# Patient Record
Sex: MALE | Race: WHITE | NOT HISPANIC OR LATINO | Employment: FULL TIME | URBAN - METROPOLITAN AREA
[De-identification: names, ages, dates, MRNs, and addresses within clinical notes are randomized per-mention and may not be internally consistent; named-entity substitution may affect disease eponyms.]

---

## 2018-07-04 ENCOUNTER — HOSPITAL ENCOUNTER (OUTPATIENT)
Facility: MEDICAL CENTER | Age: 78
End: 2018-07-05
Attending: EMERGENCY MEDICINE | Admitting: HOSPITALIST
Payer: COMMERCIAL

## 2018-07-04 ENCOUNTER — APPOINTMENT (OUTPATIENT)
Dept: RADIOLOGY | Facility: MEDICAL CENTER | Age: 78
End: 2018-07-04
Attending: EMERGENCY MEDICINE
Payer: COMMERCIAL

## 2018-07-04 DIAGNOSIS — R55 SYNCOPE, UNSPECIFIED SYNCOPE TYPE: ICD-10-CM

## 2018-07-04 PROBLEM — I49.9 ARRHYTHMIA: Status: ACTIVE | Noted: 2018-07-04

## 2018-07-04 PROBLEM — E78.5 HLD (HYPERLIPIDEMIA): Status: ACTIVE | Noted: 2018-07-04

## 2018-07-04 LAB
ALBUMIN SERPL BCP-MCNC: 4.4 G/DL (ref 3.2–4.9)
ALBUMIN/GLOB SERPL: 2 G/DL
ALP SERPL-CCNC: 104 U/L (ref 30–99)
ALT SERPL-CCNC: 17 U/L (ref 2–50)
ANION GAP SERPL CALC-SCNC: 9 MMOL/L (ref 0–11.9)
APPEARANCE UR: CLEAR
APTT PPP: 38.6 SEC (ref 24.7–36)
AST SERPL-CCNC: 19 U/L (ref 12–45)
BASOPHILS # BLD AUTO: 0.3 % (ref 0–1.8)
BASOPHILS # BLD: 0.03 K/UL (ref 0–0.12)
BILIRUB SERPL-MCNC: 1.5 MG/DL (ref 0.1–1.5)
BILIRUB UR QL STRIP.AUTO: NEGATIVE
BNP SERPL-MCNC: 507 PG/ML (ref 0–100)
BUN SERPL-MCNC: 20 MG/DL (ref 8–22)
CALCIUM SERPL-MCNC: 10.8 MG/DL (ref 8.5–10.5)
CHLORIDE SERPL-SCNC: 107 MMOL/L (ref 96–112)
CO2 SERPL-SCNC: 20 MMOL/L (ref 20–33)
COLOR UR: YELLOW
CREAT SERPL-MCNC: 1.44 MG/DL (ref 0.5–1.4)
DIGOXIN SERPL-MCNC: 0.3 NG/ML (ref 0.8–2)
EKG IMPRESSION: NORMAL
EOSINOPHIL # BLD AUTO: 0.23 K/UL (ref 0–0.51)
EOSINOPHIL NFR BLD: 2.4 % (ref 0–6.9)
ERYTHROCYTE [DISTWIDTH] IN BLOOD BY AUTOMATED COUNT: 51 FL (ref 35.9–50)
GLOBULIN SER CALC-MCNC: 2.2 G/DL (ref 1.9–3.5)
GLUCOSE SERPL-MCNC: 136 MG/DL (ref 65–99)
GLUCOSE UR STRIP.AUTO-MCNC: NEGATIVE MG/DL
HCT VFR BLD AUTO: 40.8 % (ref 42–52)
HGB BLD-MCNC: 13.4 G/DL (ref 14–18)
IMM GRANULOCYTES # BLD AUTO: 0.1 K/UL (ref 0–0.11)
IMM GRANULOCYTES NFR BLD AUTO: 1.1 % (ref 0–0.9)
INR PPP: 1.72 (ref 0.87–1.13)
KETONES UR STRIP.AUTO-MCNC: NEGATIVE MG/DL
LACTATE BLD-SCNC: 1.8 MMOL/L (ref 0.5–2)
LEUKOCYTE ESTERASE UR QL STRIP.AUTO: NEGATIVE
LYMPHOCYTES # BLD AUTO: 1.95 K/UL (ref 1–4.8)
LYMPHOCYTES NFR BLD: 20.7 % (ref 22–41)
MCH RBC QN AUTO: 30.6 PG (ref 27–33)
MCHC RBC AUTO-ENTMCNC: 32.8 G/DL (ref 33.7–35.3)
MCV RBC AUTO: 93.2 FL (ref 81.4–97.8)
MICRO URNS: NORMAL
MONOCYTES # BLD AUTO: 0.89 K/UL (ref 0–0.85)
MONOCYTES NFR BLD AUTO: 9.4 % (ref 0–13.4)
NEUTROPHILS # BLD AUTO: 6.24 K/UL (ref 1.82–7.42)
NEUTROPHILS NFR BLD: 66.1 % (ref 44–72)
NITRITE UR QL STRIP.AUTO: NEGATIVE
NRBC # BLD AUTO: 0 K/UL
NRBC BLD-RTO: 0 /100 WBC
PH UR STRIP.AUTO: 5 [PH]
PLATELET # BLD AUTO: 129 K/UL (ref 164–446)
PMV BLD AUTO: 11.7 FL (ref 9–12.9)
POTASSIUM SERPL-SCNC: 4.2 MMOL/L (ref 3.6–5.5)
PROT SERPL-MCNC: 6.6 G/DL (ref 6–8.2)
PROT UR QL STRIP: NEGATIVE MG/DL
PROTHROMBIN TIME: 19.8 SEC (ref 12–14.6)
RBC # BLD AUTO: 4.38 M/UL (ref 4.7–6.1)
RBC UR QL AUTO: NEGATIVE
SODIUM SERPL-SCNC: 136 MMOL/L (ref 135–145)
SP GR UR STRIP.AUTO: 1.01
T4 FREE SERPL-MCNC: 0.82 NG/DL (ref 0.53–1.43)
TROPONIN I SERPL-MCNC: 0.02 NG/ML (ref 0–0.04)
TROPONIN I SERPL-MCNC: 0.02 NG/ML (ref 0–0.04)
TSH SERPL DL<=0.005 MIU/L-ACNC: 11.33 UIU/ML (ref 0.38–5.33)
UROBILINOGEN UR STRIP.AUTO-MCNC: 1 MG/DL
WBC # BLD AUTO: 9.4 K/UL (ref 4.8–10.8)

## 2018-07-04 PROCEDURE — 80053 COMPREHEN METABOLIC PANEL: CPT

## 2018-07-04 PROCEDURE — 70450 CT HEAD/BRAIN W/O DYE: CPT

## 2018-07-04 PROCEDURE — G0378 HOSPITAL OBSERVATION PER HR: HCPCS

## 2018-07-04 PROCEDURE — 85610 PROTHROMBIN TIME: CPT

## 2018-07-04 PROCEDURE — 93005 ELECTROCARDIOGRAM TRACING: CPT | Performed by: EMERGENCY MEDICINE

## 2018-07-04 PROCEDURE — 84484 ASSAY OF TROPONIN QUANT: CPT

## 2018-07-04 PROCEDURE — 700102 HCHG RX REV CODE 250 W/ 637 OVERRIDE(OP): Performed by: HOSPITALIST

## 2018-07-04 PROCEDURE — 84443 ASSAY THYROID STIM HORMONE: CPT

## 2018-07-04 PROCEDURE — 71045 X-RAY EXAM CHEST 1 VIEW: CPT

## 2018-07-04 PROCEDURE — 83880 ASSAY OF NATRIURETIC PEPTIDE: CPT

## 2018-07-04 PROCEDURE — 84439 ASSAY OF FREE THYROXINE: CPT

## 2018-07-04 PROCEDURE — A9270 NON-COVERED ITEM OR SERVICE: HCPCS | Performed by: INTERNAL MEDICINE

## 2018-07-04 PROCEDURE — 700102 HCHG RX REV CODE 250 W/ 637 OVERRIDE(OP): Performed by: INTERNAL MEDICINE

## 2018-07-04 PROCEDURE — 700105 HCHG RX REV CODE 258: Performed by: HOSPITALIST

## 2018-07-04 PROCEDURE — 81003 URINALYSIS AUTO W/O SCOPE: CPT

## 2018-07-04 PROCEDURE — 85730 THROMBOPLASTIN TIME PARTIAL: CPT

## 2018-07-04 PROCEDURE — A9270 NON-COVERED ITEM OR SERVICE: HCPCS | Performed by: HOSPITALIST

## 2018-07-04 PROCEDURE — 700105 HCHG RX REV CODE 258: Performed by: INTERNAL MEDICINE

## 2018-07-04 PROCEDURE — 304561 HCHG STAT O2

## 2018-07-04 PROCEDURE — 99285 EMERGENCY DEPT VISIT HI MDM: CPT

## 2018-07-04 PROCEDURE — 99220 PR INITIAL OBSERVATION CARE,LEVL III: CPT | Performed by: HOSPITALIST

## 2018-07-04 PROCEDURE — 83605 ASSAY OF LACTIC ACID: CPT

## 2018-07-04 PROCEDURE — 80162 ASSAY OF DIGOXIN TOTAL: CPT

## 2018-07-04 PROCEDURE — 93005 ELECTROCARDIOGRAM TRACING: CPT

## 2018-07-04 PROCEDURE — 99358 PROLONG SERVICE W/O CONTACT: CPT | Performed by: INTERNAL MEDICINE

## 2018-07-04 PROCEDURE — 85025 COMPLETE CBC W/AUTO DIFF WBC: CPT

## 2018-07-04 RX ORDER — ATORVASTATIN CALCIUM 10 MG/1
10 TABLET, FILM COATED ORAL EVERY MORNING
COMMUNITY

## 2018-07-04 RX ORDER — SODIUM CHLORIDE 9 MG/ML
INJECTION, SOLUTION INTRAVENOUS CONTINUOUS
Status: DISCONTINUED | OUTPATIENT
Start: 2018-07-04 | End: 2018-07-05

## 2018-07-04 RX ORDER — POLYETHYLENE GLYCOL 3350 17 G/17G
1 POWDER, FOR SOLUTION ORAL
Status: DISCONTINUED | OUTPATIENT
Start: 2018-07-04 | End: 2018-07-05 | Stop reason: HOSPADM

## 2018-07-04 RX ORDER — BISACODYL 10 MG
10 SUPPOSITORY, RECTAL RECTAL
Status: DISCONTINUED | OUTPATIENT
Start: 2018-07-04 | End: 2018-07-05 | Stop reason: HOSPADM

## 2018-07-04 RX ORDER — DIGOXIN 125 MCG
125 TABLET ORAL DAILY
Status: DISCONTINUED | OUTPATIENT
Start: 2018-07-04 | End: 2018-07-05 | Stop reason: HOSPADM

## 2018-07-04 RX ORDER — IBUPROFEN 200 MG
800 TABLET ORAL 3 TIMES DAILY PRN
Status: ON HOLD | COMMUNITY
End: 2018-07-05

## 2018-07-04 RX ORDER — ATORVASTATIN CALCIUM 10 MG/1
10 TABLET, FILM COATED ORAL NIGHTLY
Status: DISCONTINUED | OUTPATIENT
Start: 2018-07-04 | End: 2018-07-05 | Stop reason: HOSPADM

## 2018-07-04 RX ORDER — SODIUM CHLORIDE 9 MG/ML
1000 INJECTION, SOLUTION INTRAVENOUS ONCE
Status: COMPLETED | OUTPATIENT
Start: 2018-07-04 | End: 2018-07-04

## 2018-07-04 RX ORDER — ALLOPURINOL 300 MG/1
300 TABLET ORAL EVERY MORNING
COMMUNITY

## 2018-07-04 RX ORDER — DIGOXIN 125 MCG
125 TABLET ORAL EVERY MORNING
COMMUNITY

## 2018-07-04 RX ORDER — ACETAMINOPHEN 500 MG
500 TABLET ORAL EVERY 6 HOURS PRN
Status: DISCONTINUED | OUTPATIENT
Start: 2018-07-04 | End: 2018-07-05 | Stop reason: HOSPADM

## 2018-07-04 RX ORDER — AMOXICILLIN 250 MG
2 CAPSULE ORAL 2 TIMES DAILY
Status: DISCONTINUED | OUTPATIENT
Start: 2018-07-04 | End: 2018-07-05 | Stop reason: HOSPADM

## 2018-07-04 RX ORDER — TESTOSTERONE GEL, 1% 10 MG/G
50 GEL TRANSDERMAL DAILY
Status: ON HOLD | COMMUNITY
End: 2018-07-04

## 2018-07-04 RX ORDER — TRAMADOL HYDROCHLORIDE 50 MG/1
50 TABLET ORAL 2 TIMES DAILY PRN
COMMUNITY

## 2018-07-04 RX ORDER — AMOXICILLIN 500 MG
1200 CAPSULE ORAL EVERY MORNING
COMMUNITY

## 2018-07-04 RX ORDER — TESTOSTERONE 40.5 MG/2.5G
40.5 GEL TOPICAL DAILY
COMMUNITY

## 2018-07-04 RX ORDER — ALBUTEROL SULFATE 90 UG/1
2 AEROSOL, METERED RESPIRATORY (INHALATION) EVERY 4 HOURS PRN
COMMUNITY

## 2018-07-04 RX ADMIN — DIGOXIN 125 MCG: 125 TABLET ORAL at 17:37

## 2018-07-04 RX ADMIN — SODIUM CHLORIDE 1000 ML: 9 INJECTION, SOLUTION INTRAVENOUS at 20:52

## 2018-07-04 RX ADMIN — SODIUM CHLORIDE 1000 ML: 9 INJECTION, SOLUTION INTRAVENOUS at 06:48

## 2018-07-04 RX ADMIN — ACETAMINOPHEN 500 MG: 500 TABLET ORAL at 17:37

## 2018-07-04 RX ADMIN — ATORVASTATIN CALCIUM 10 MG: 10 TABLET, FILM COATED ORAL at 20:51

## 2018-07-04 RX ADMIN — STANDARDIZED SENNA CONCENTRATE AND DOCUSATE SODIUM 2 TABLET: 8.6; 5 TABLET, FILM COATED ORAL at 17:42

## 2018-07-04 ASSESSMENT — COGNITIVE AND FUNCTIONAL STATUS - GENERAL
PERSONAL GROOMING: TOTAL
WALKING IN HOSPITAL ROOM: A LITTLE
SUGGESTED CMS G CODE MODIFIER MOBILITY: CL
MOVING FROM LYING ON BACK TO SITTING ON SIDE OF FLAT BED: A LOT
DAILY ACTIVITIY SCORE: 10
MOVING TO AND FROM BED TO CHAIR: A LOT
TURNING FROM BACK TO SIDE WHILE IN FLAT BAD: A LOT
STANDING UP FROM CHAIR USING ARMS: A LOT
DRESSING REGULAR UPPER BODY CLOTHING: A LOT
TOILETING: A LOT
SUGGESTED CMS G CODE MODIFIER DAILY ACTIVITY: CL
MOBILITY SCORE: 13
CLIMB 3 TO 5 STEPS WITH RAILING: A LOT
HELP NEEDED FOR BATHING: A LOT
DRESSING REGULAR LOWER BODY CLOTHING: A LOT
EATING MEALS: TOTAL

## 2018-07-04 ASSESSMENT — PATIENT HEALTH QUESTIONNAIRE - PHQ9
SUM OF ALL RESPONSES TO PHQ9 QUESTIONS 1 AND 2: 0
2. FEELING DOWN, DEPRESSED, IRRITABLE, OR HOPELESS: NOT AT ALL
1. LITTLE INTEREST OR PLEASURE IN DOING THINGS: NOT AT ALL

## 2018-07-04 ASSESSMENT — ENCOUNTER SYMPTOMS
LOSS OF CONSCIOUSNESS: 1
HEADACHES: 0
DIZZINESS: 0
MYALGIAS: 0
ABDOMINAL PAIN: 0
CHILLS: 0
DEPRESSION: 0
TINGLING: 0
FALLS: 1
SORE THROAT: 0
WEAKNESS: 1
PHOTOPHOBIA: 0
FEVER: 0
PALPITATIONS: 0
WHEEZING: 0
DIARRHEA: 0
VOMITING: 0
COUGH: 0
NAUSEA: 0
SHORTNESS OF BREATH: 0
FOCAL WEAKNESS: 0

## 2018-07-04 ASSESSMENT — PAIN SCALES - GENERAL
PAINLEVEL_OUTOF10: 2
PAINLEVEL_OUTOF10: 0
PAINLEVEL_OUTOF10: 3
PAINLEVEL_OUTOF10: 0
PAINLEVEL_OUTOF10: 0

## 2018-07-04 ASSESSMENT — LIFESTYLE VARIABLES
AVERAGE NUMBER OF DAYS PER WEEK YOU HAVE A DRINK CONTAINING ALCOHOL: 2
TOTAL SCORE: 0
EVER_SMOKED: YES
ALCOHOL_USE: YES
HOW MANY TIMES IN THE PAST YEAR HAVE YOU HAD 5 OR MORE DRINKS IN A DAY: 0
TOTAL SCORE: 0
ON A TYPICAL DAY WHEN YOU DRINK ALCOHOL HOW MANY DRINKS DO YOU HAVE: 0
HAVE PEOPLE ANNOYED YOU BY CRITICIZING YOUR DRINKING: NO
EVER FELT BAD OR GUILTY ABOUT YOUR DRINKING: NO
EVER HAD A DRINK FIRST THING IN THE MORNING TO STEADY YOUR NERVES TO GET RID OF A HANGOVER: NO
CONSUMPTION TOTAL: NEGATIVE
TOTAL SCORE: 0
HAVE YOU EVER FELT YOU SHOULD CUT DOWN ON YOUR DRINKING: NO

## 2018-07-04 ASSESSMENT — ACTIVITIES OF DAILY LIVING (ADL): TOILETING: REQUIRES ASSIST

## 2018-07-04 NOTE — THERAPY
OT order received. Per charts, pt scheduled for heart catherization tomorrow. Will hold OT eval until appropriate.    Linda Mccormack OTR/L  Pager: [phone]

## 2018-07-04 NOTE — ASSESSMENT & PLAN NOTE
"Patient is a rather poor historian, he believes that his pacemaker was implanted because of bradycardia but he also believes that he has been diagnosed with atrial fibrillation in the past which would fit in with his medications digoxin and xarelto. However, he also states that he takes the xarelto because he had been having \"mini strokes\". Nevertheless, we will continue to monitor on telemetry overnight with further workup as noted above. Continue home xarelto on digoxin.  "

## 2018-07-04 NOTE — PROGRESS NOTES
Spent 31 minutes reviewing outside hospital records from [facility] and talking with Dr Swain, patient's cardiologist in [place], [place] about the plan. ECHO was normal with mildly elevated RVSP, otherwise extensive work up for syncope was negative including tilt table test. Patient does express some positional hypoxia and there is concern for need of R heart cath, which patient is agreeable to undergo here now. Consulted Dr Buckner of cardiology. Also, extensive discussion with family and it appears the syncopal episodes relate quite well to micturition and that he should seek out a urologist in [place] for possible urodynamic monitoring and treatment of what sounds like urge incontinence.    I spent between 11:45 AM to 12:16 pm reviewing above records.

## 2018-07-04 NOTE — H&P
" Hospital Medicine History and Physical    Date of Service  7/4/2018    Chief Complaint  Chief Complaint   Patient presents with   • Syncope     Patient was found down by family       History of Presenting Illness  78 y.o. male who presented on 7/4/2018 after a syncopal episode at home. The patient has no memory of these particular circumstances which brought him to the hospital. However son had been present at the time and is able to provide history. His son states that the patient had been going to bed with his supplemental oxygen on. His son and his wife managed to get him onto the bed and put his CPAP on at which point the patient had become lightheaded. He laid down and it needed to use the urinal therefore his son stepped out of the room while his wife assisted him. Seconds later, the patient's son was called back into the room by his wife who reported that the patient had \"stopped breathing\". She had begun mouth-to-mouth respirations and another relative had begun CPR. Son does not believe anyone had checked for pulse therefore it is unknown if any of these resuscitation measures were actually needed at that time. Shortly after initiation of CPR, the patient regained consciousness but was confused. EMS was alerted he was brought to the hospital.    For the past 3 months, the patient has had a significant physical deconditioning and decline. His son states that 3 months ago, he was up and independent. Since then, he has fallen at least 6 times resulting in 2 broken arms. In the last 2 weeks, he had an extended hospital stay at [facility] in [place], [place] at which time he had apparently been worked up extensively for these recurrent syncopal episodes. This included tilt table testing, EEG, MRI of the brain, echocardiogram and carotid ultrasounds. He was followed closely by his cardiologist who had recommended a potential test to assess for vascular insufficiency in the left leg however this " "is the one last test which is still pending. According to the son, the patient has occasional episodes of confusion after the syncopal episodes but by and in large, he is alert and oriented when he regains consciousness with no symptoms consistent with postictal confusion.      Primary Care Physician  No primary care provider on file.    Consultants  None    Code Status  Full    Review of Systems  Review of Systems   Constitutional: Positive for malaise/fatigue. Negative for chills and fever.   HENT: Negative for congestion and sore throat.    Eyes: Negative for photophobia.   Respiratory: Negative for cough, shortness of breath and wheezing.    Cardiovascular: Negative for chest pain and palpitations.   Gastrointestinal: Negative for abdominal pain, diarrhea, nausea and vomiting.   Genitourinary: Negative for dysuria.   Musculoskeletal: Positive for falls. Negative for myalgias.   Skin: Negative.    Neurological: Positive for loss of consciousness and weakness. Negative for dizziness, tingling, focal weakness and headaches.   Psychiatric/Behavioral: Negative for depression and suicidal ideas.        Past Medical History  Past Medical History:   Diagnosis Date   • COPD (chronic obstructive pulmonary disease) (HCC)    • Pacemaker    Cardiac arrhythmia, hyperlipidemia, previous \"mini strokes\".    Surgical History  Pacemaker implant, lap band, appendectomy    Medications  No current facility-administered medications on file prior to encounter.      No current outpatient prescriptions on file prior to encounter.       Family History  No family history of seizure disorder    Social History  Social History   Substance Use Topics   • Smoking status: Never Smoker   • Smokeless tobacco: Never Used   • Alcohol use No      Comment: occ       Allergies  No Known Allergies     Physical Exam  Laboratory   Hemodynamics  Temp (24hrs), Av.9 °C (96.6 °F), Min:35.9 °C (96.6 °F), Max:35.9 °C (96.6 °F)   Temperature: 35.9 °C (96.6 " °F)  Pulse  Av.8  Min: 67  Max: 73 Heart Rate (Monitored): 70  Blood Pressure : 139/76, NIBP: 124/75      Respiratory      Respiration: 18, Pulse Oximetry: 94 %             Physical Exam   Constitutional: He is oriented to person, place, and time. No distress.   Elderly, frail   HENT:   Head: Normocephalic and atraumatic.   Right Ear: External ear normal.   Left Ear: External ear normal.   Eyes: EOM are normal. Right eye exhibits no discharge. Left eye exhibits no discharge.   Neck: Neck supple. No JVD present.   Cardiovascular: Normal rate, regular rhythm and normal heart sounds.    Irregular, distant heart sounds   Pulmonary/Chest: Effort normal and breath sounds normal. No respiratory distress. He exhibits no tenderness.   Abdominal: Soft. Bowel sounds are normal. He exhibits no distension. There is no tenderness.   Musculoskeletal: He exhibits no edema.   Neurological: He is alert and oriented to person, place, and time. No cranial nerve deficit.   Somnolent   Skin: Skin is dry. He is not diaphoretic. No erythema.   Psychiatric: He has a normal mood and affect. His behavior is normal.   Nursing note and vitals reviewed.    Capillary refill less than 3 seconds, distal pulses intact    Recent Labs      18   0035   WBC  9.4   RBC  4.38*   HEMOGLOBIN  13.4*   HEMATOCRIT  40.8*   MCV  93.2   MCH  30.6   MCHC  32.8*   RDW  51.0*   PLATELETCT  129*   MPV  11.7     Recent Labs      18   0035   SODIUM  136   POTASSIUM  4.2   CHLORIDE  107   CO2  20   GLUCOSE  136*   BUN  20   CREATININE  1.44*   CALCIUM  10.8*     Recent Labs      18   0035   ALTSGPT  17   ASTSGOT  19   ALKPHOSPHAT  104*   TBILIRUBIN  1.5   GLUCOSE  136*         Recent Labs      18   0035   BNPBTYPENAT  507*         Lab Results   Component Value Date    TROPONINI 0.02 2018       Imaging  Ct-head W/o    Result Date: 2018 2:07 AM HISTORY/REASON FOR EXAM:  Altered Mental Status. Anticoagulated. Fall  TECHNIQUE/EXAM DESCRIPTION AND NUMBER OF VIEWS:    CT of the head without contrast. Contiguous 5 mm axial sections were obtained from the skull base through the vertex. Up to date radiation dose reduction adjustments have been utilized to meet ALARA standards for radiation dose reduction. COMPARISON: None. FINDINGS: There is cerebral volume loss. The ventricular system is normal in size and position for the degree of cerebral volume loss. There are nonspecific white matter hypodensities, seen best at the left frontal and parietal lobes along the watershed region. Focal CSF density in the left caudate head compatible with a chronic lacunar infarction No mass or hemorrhage is present. Visualized paranasal sinuses are clear.     No noncontrast CT evidence of acute intracranial hemorrhage. Moderate white matter disease with small left encephalomalacia along watershed distribution Age-appropriate cerebral volume loss.    Dx-chest-portable (1 View)    Result Date: 7/4/2018 7/4/2018 1:15 AM HISTORY/REASON FOR EXAM: Shortness of Breath. Syncope TECHNIQUE/EXAM DESCRIPTION AND NUMBER OF VIEWS: Single AP view of the chest. COMPARISON: None FINDINGS: Hardware: Right transsubclavian pacer is present. The generator obscures underlying structures. Lungs: Increased reticular opacity in the mid and lower lung zones is seen Pleura:  No pleural space process is seen. Heart and mediastinum: There is stable cardiac silhouette enlargement.     Cardiac silhouette enlargement with reticular pulmonary opacity which could indicate edema or underlying interstitial lung disease/scarring     Assessment/Plan     Anticipate that patient will need less than 2 midnights for management of the discussed medical issues.    * Syncope   Assessment & Plan    Patient has apparently had a very extensive workup for this in the last 2 weeks. Per his son, he underwent a tilt table test at [facility] in [place] and then a battery of other tests in [place] at  [place] which included an EEG, MRI of the head, carotid ultrasound, echocardiogram, however I do not have these results to review. I have requested records from both [place] and [place] and I'm holding off on any further testing until these can be reviewed. I will in the meantime monitor him on telemetry for any evidence of worsening arrhythmia, I will trend troponin levels, check orthostatic blood pressures and give him gentle IV fluids. We will plan on discussing this with his cardiologist in in [place], Dr. Rosas Swain at [phone] in the morning. Patient's son believes that cardiology had recommended a vascular study of his left leg at some point although I do not see that vascular insufficiency alone could cause multiple episodes of falling and significant deconditioning in the last 3 months. But we may also consider in-house vascular and or neurology consultation pending discussion with the patient's cardiologist.        HLD (hyperlipidemia)   Assessment & Plan    Continue home Lipitor, treatment as noted above.        Arrhythmia   Assessment & Plan    Patient is a rather poor historian, he believes that his pacemaker was implanted because of bradycardia but he also believes that he has been diagnosed with atrial fibrillation in the past which would fit in with his medications digoxin and xarelto. However, he also states that he takes the xarelto because he had been having \"mini strokes\". Nevertheless, we will continue to monitor on telemetry overnight with further workup as noted above. Continue home xarelto and digoxin.          Prophylaxis: Sequential compression devices for DVT prophylaxis, no PPI indicated, bowel protocol as needed         "

## 2018-07-04 NOTE — ED NOTES
Patient given urinal and educated on the need for a sample. Family at bedside and updated on POC.

## 2018-07-04 NOTE — PROGRESS NOTES
Bedside report completed in ED with Kyra FLORES. Transported pt to tele on [facility]. Placed on cardiac monitor. V paced 70's. VSS. A/Ox4. Bed alarm on and call light within reach.

## 2018-07-04 NOTE — ED NOTES
Patient sleeping comfortably in bed. Chest rise noted. VSS. Family updated on POC. All needs met.

## 2018-07-04 NOTE — CARE PLAN
Problem: Safety  Goal: Will remain free from falls  Outcome: PROGRESSING AS EXPECTED  Pt had no falls this shift.    Problem: Respiratory:  Goal: Respiratory status will improve  Outcome: PROGRESSING AS EXPECTED  Pt on baseline supplemental O2 at 4L with O2 sats in the 90's.

## 2018-07-04 NOTE — ASSESSMENT & PLAN NOTE
Patient has apparently had a very extensive workup for this in the last 2 weeks. Per his son, he underwent a tilt table test at [facility] in [place] and then a battery of other tests in [facility] at [place] which included an EEG, MRI of the head, carotid ultrasound, echocardiogram, however I do not have these results to review. I have requested records from both [place] and [place] and I'm holding off on any further testing until these can be reviewed. I will in the meantime monitor him on telemetry for any evidence of worsening arrhythmia, I will trend troponin levels, check orthostatic blood pressures and give him gentle IV fluids. We will plan on discussing this with his cardiologist in in [place], Dr. Rosas Swain at [phone] in the morning. Patient's son believes that cardiology had recommended a vascular study of his left leg at some point although I do not see that vascular insufficiency alone could cause multiple episodes of falling and significant deconditioning in the last 3 months. But we may also consider in-house vascular and or neurology consultation pending discussion with the patient's cardiologist.

## 2018-07-04 NOTE — PROGRESS NOTES
2 RN skin check completed with Chantelle FLORES. Bruise to left arm. Laceration to 4th finger left hand. Covered with gauze and tape. Bilateral forearm casts r/t fractures. Bilateral feet dry. No open areas. No other skin alterations.

## 2018-07-04 NOTE — CONSULTS
Cardiology consultation   asking for consultation: Dr. Delbert Richards  Reason for consultation: Syncope      HPI:      Pleasant 78-year-old gentleman who lives outside of [facility], [place], operated by [facility].  He has been having recurrent syncope and actually has had a full workup done in [place], the records are not available although we have spoken with his primary cardiologist there today.  He is a family friend.      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  He subsequently retired to [facility] [place], operated by [facility] where he lives with his extended family.  He does have a history of COPD and a pacemaker which was recently interrogated were functioning normally.  He also has persistent atrial fibrillation for which he is on anticoagulation.    He was airlifted here by helicopter today from near his home at [facility] [place], operated by [facility] because of recurrence of his syncope.  He said he had been drinking alcohol nor was he dehydrated.  He has been taking his regular medications including digoxin and Xarelto    When he got here at midnight he had been found down by his family.  According to his family he is playing cards and went to the bathroom and did not have his oxygen on.  He had bystander CPR.  He had a pulse and was alert and awake when EMTs arrived.  No arrhythmias were known.  Incidentally, he has casts on both of his forearms because of his previous falls in similar situations.    Past Medical History:    COPD, home oxygen  CPAP for LO  Pacemaker for sick sinus syndrome  Gout  Persistent fibrillation on anticoagulation  Chronic medical decline, ataxia --- recent workup this year included EEG brain MRI tilt table testing echoes and carotids     Past Surgical History: No significant surgical history  Past Social History:  reports that he has never smoked. He has never used smokeless tobacco. He reports that he does not drink alcohol or use drugs.  Past Family History: History reviewed. No pertinent family  history.  Allergies: Patient has no known allergies.    Current Medications:  Prior to Admission medications    Medication Sig Start Date End Date Taking? Authorizing Provider   allopurinol (ZYLOPRIM) 300 MG Tab Take 300 mg by mouth every day.   Yes Nn Emergency Md Per ZACH Hernandez   atorvastatin (LIPITOR) 10 MG Tab Take 10 mg by mouth every evening.   Yes Nn Emergency Md Per ZACH Hernandez   rivaroxaban (XARELTO) 20 MG Tab tablet Take 20 mg by mouth with dinner.   Yes Nn Emergency Md Per ZACH Hernandez   digoxin (LANOXIN) 125 MCG Tab Take 125 mcg by mouth every day.   Yes Nn Emergency Md Per ZACH Hernandez   testosterone (TESTIM,ANDROGEL) 50 MG/5GM (1%) Gel gel Apply 50 mg to skin as directed every day.   Yes Nn Emergency Md Per ZACH Hernandez   Umeclidinium Bromide (INCRUSE ELLIPTA) 62.5 MCG/INH AEROSOL POWDER, BREATH ACTIVATED Inhale 1 Puff by mouth.   Yes Nn Emergency Md Per ZACH Hernandez   albuterol 108 (90 Base) MCG/ACT Aero Soln inhalation aerosol Inhale 2 Puffs by mouth as needed for Shortness of Breath.   Yes Nn Emergency Md Per Protocol, M.D.       Review of Systems:  Review of Systems   Constitutional: Positive for malaise/fatigue. Negative for chills, diaphoresis, fever and weight loss.   HENT: Negative for congestion and hearing loss.    Eyes: Negative.    Respiratory: Positive for shortness of breath. Negative for cough, sputum production and wheezing.    Cardiovascular: Negative for chest pain, palpitations, leg swelling and PND.   Gastrointestinal: Negative for abdominal pain and heartburn.   Musculoskeletal: Negative for back pain and myalgias.   Skin: Negative for rash.   Neurological: Negative for tingling, weakness and headaches.   Endo/Heme/Allergies: Does not bruise/bleed easily.   Psychiatric/Behavioral: Negative for depression. The patient is not nervous/anxious and does not have insomnia.    All other systems reviewed and are negative.    Blood pressure 103/59, pulse 68, temperature 36.2  "°C (97.2 °F), resp. rate 20, height 1.93 m (6' 4\"), weight (!) 128.9 kg (284 lb 2.8 oz), SpO2 100 %.    Physical Examination:  Physical Exam   Constitutional: He is oriented to person, place, and time.   Obese and plethoric, very talkative short arm cast on bilaterally   HENT:   Head: Normocephalic and atraumatic.   Eyes: EOM are normal. Pupils are equal, round, and reactive to light. No scleral icterus.   Neck: No JVD present. No thyromegaly present.   Cardiovascular: Normal rate, regular rhythm and intact distal pulses.    No murmur heard.  Pulmonary/Chest: Breath sounds normal. No respiratory distress. He exhibits no tenderness.   Pacemaker site well-healed   Abdominal: Bowel sounds are normal. He exhibits no distension.   Musculoskeletal: He exhibits no edema or tenderness.   Lymphadenopathy:     He has no cervical adenopathy.   Neurological: He is alert and oriented to person, place, and time.   Skin: Skin is warm and dry.   Psychiatric: He has a normal mood and affect. His behavior is normal.       Data:  Twelve-lead EKG reviewed by me, ventricular paced  Troponin normal ×2,  CMP normal outside of a GFR 47 albumin 4.4  CBC normal with a normal MCV and hemoglobin 13.4 differential is normal  Chest x-ray reviewed by me shows no focal infiltrate or effusion  CAT scan of the head is normal without bleeding    TSH 11.3    Impression:    Recurrent syncope  No evidence of clinical heart failure or ACS  Suspect pulmonary hypertension in the setting of CPAP/sleep apnea and home oxygen COPD  Abnormal thyroid Axis      Plan:    Talked at length about his prior workup which is quite thorough.  I do not think he needs a left heart catheterization I do not think this involves coronary disease or ACS.  Reasonable test would be a right heart catheterization will try to get this done tomorrow.  I had a long talk with him about how this could be an outpatient procedure but it is reasonable to try to get it done inpatient " particular with his remote living status and his recurrent hospitalizations    N.p.o. after midnight  Check free T4 and free T3    Discussed at length with patient and also Dr. Mckeon

## 2018-07-04 NOTE — ED TRIAGE NOTES
"Chief Complaint   Patient presents with   • Syncope     Patient was found down by family     Patient brought in via flight med from [facility], [place], operated by [facility]. Patient is visiting from out of town. Per report patient was playing cards and got up to go to bedroom, he did not have his oxygen on. Family found him pass out in the room and started CPR. When EMS arrived patient was awake. Patient is a poor historian. Patient had an episode of incontinence. Patient A+Ox4 on arrival.     Patient has bilateral forearm casts in place. Per patient they are from different falls. Patient states he has fallen 6 times in the last month.     Placed on monitor and in gown.     Blood Pressure : 139/76, Pulse: 73, Respiration: 18, Temperature: 35.9 °C (96.6 °F), Height: 193 cm (6' 4\"), Weight: 122.5 kg (270 lb), BMI (Calculated): 32.87, BSA (Calculated): 2.6, Pulse Oximetry: 91 %, O2 (LPM): 4, O2 Delivery: Nasal Cannula    "

## 2018-07-04 NOTE — ED PROVIDER NOTES
CHIEF COMPLAINT  Chief Complaint   Patient presents with   • Syncope     Patient was found down by family       HPI  Gaagndeep Graham is a 78 y.o. male who presents for evaluation after passing out on his bed tonight. Patient notes he has passed out at least 6 times within the past year and was recently hospitalized at [facility] in [place] for the same problem. He notes he has a pacemaker in the right side of his chest but does not know why it is there except that his heart rate was slow at one time. Tonight, he was lying on his bed and felt dizzy and lightheaded. He notes that he passed out at that time and woke up with his family attempting CPR on him. It is unclear whether he had heartbeat at that time. He did not have any further episodes but did get nauseous once EMS arrived. Patient is essentially symptomatic on arrival to our emergency department but does have pain in his wrists as he has had recent fractures and is currently wearing short arm cast on both sides. He denies any overt chest pain or shortness of breath before or after the syncopal event and denies any headache.    REVIEW OF SYSTEMS  Constitutional: No fevers or chills, does note generalized weakness which is not new   Skin: No rashes, abrasions, lacerations, or pruritus  HEENT: No diplopia or blurred vision, no eye pain, no discharge. No ear pain, ringing in ears, or decreased hearing. No sore throat, runny nose, sores, trouble swallowing, trouble speaking.  Neck: No neck pain, stiffness, or masses.  Chest: No pain or rashes  Pulm: No shortness of breath, cough, wheezing, stridor, or pain with inspiration/expiration  Gastrointestinal: No  vomiting, diarrhea, constipation, bloating, melena, hematochezia or pain.  Genitourinary: No pain, urgency, frequency, dysuria, hematuria, or polyuria.   Musculoskeletal: Both arms have short arm casts from recent distal forearm or wrist fractures.  Neurologic: No sensory or motor changes.   Heme:  "Bruises and bleeds easily  Immuno: No hx of recurrent infections      PAST MEDICAL HISTORY   has a past medical history of COPD (chronic obstructive pulmonary disease) (HCC) and Pacemaker.    SOCIAL HISTORY  Social History     Social History Main Topics   • Smoking status: Never Smoker   • Smokeless tobacco: Never Used   • Alcohol use No      Comment: occ   • Drug use: No   • Sexual activity: Not on file       SURGICAL HISTORY  patient denies any surgical history    CURRENT MEDICATIONS  Home Medications     Reviewed by Kyra Go R.N. (Registered Nurse) on 07/04/18 at 0144  Med List Status: Complete   Medication Last Dose Status   albuterol 108 (90 Base) MCG/ACT Aero Soln inhalation aerosol 7/3/2018 Active   allopurinol (ZYLOPRIM) 300 MG Tab 7/3/2018 Active   atorvastatin (LIPITOR) 10 MG Tab 7/3/2018 Active   digoxin (LANOXIN) 125 MCG Tab 7/3/2018 Active   rivaroxaban (XARELTO) 20 MG Tab tablet 7/3/2018 Active   testosterone (TESTIM,ANDROGEL) 50 MG/5GM (1%) Gel gel 7/3/2018 Active   Umeclidinium Bromide (INCRUSE ELLIPTA) 62.5 MCG/INH AEROSOL POWDER, BREATH ACTIVATED 7/3/2018 Active                ALLERGIES  No Known Allergies    PHYSICAL EXAM  VITAL SIGNS: /76   Pulse 73   Temp 35.9 °C (96.6 °F)   Resp 19   Ht 1.93 m (6' 4\")   Wt 122.5 kg (270 lb)   SpO2 94%   BMI 32.87 kg/m²    Gen: Alert in no apparent distress. Calm, conversant . Wearing a nasal cannula   HEENT: No signs of trauma, Bilateral external ears normal, Nose normal. Conjunctiva normal, Non-icteric. PERRLA, EOMI. He pulls her approximately 2 mm   Neck:  No tenderness, Supple, No masses  Lymphatic: No cervical lymphadenopathy noted.   Cardiovascular: Regular rate and rhythm, no murmurs.   Thorax & Lungs: Normal breath sounds, No respiratory distress, No wheezing bilateral chest rise  Abdomen: Bowel sounds normal, Soft, No tenderness, No masses, No pulsatile masses. No Guarding or rebound  Skin: Warm, Dry, No erythema, No rash.   Back: " No bony tenderness, No CVA tenderness.   Extremities: Intact distal pulses, mild nonpitting edema to left lower extremity most notably around the dorsum of the foot and ankle, there are short arm casts noted to both upper extremities. Fingers are warm and dry, capillary refill less than 3 seconds to all extremities.   Neurologic: Alert , no facial droop, grossly normal coordination and strength  Psychiatric: Affect normal, Judgment normal, Mood normal.       INITIAL IMPRESSION  Patient had an episode suggestive of a syncopal event of unclear etiology. It was non-provoked as it was notable he was lying down. He did also lose bladder control but did not have any chest pain or shortness of breath. He did get mildly nauseous after waking. The patient is noted to be onlo but does not state he has a headache. Regardless, I feel CT imaging of his brain is necessary to rule out any spontaneous hemorrhage and I will perform screening laboratory evaluation to include cardiac enzymes and a chest x-ray. The patient did not fall and has no neck pain or tenderness on initial exam. I do not feel further CT imaging of the neck is necessary at this time. I will reserve CT imaging of the chest for possible pulmonary embolism but with no chest pain or shortness of breath currently, I doubt that is necessary. He is on home oxygen at 3-4 L and is maintaining his saturation at that level in the emergency department.      LABS  Results for orders placed or performed during the hospital encounter of 07/04/18   CBC WITH DIFFERENTIAL   Result Value Ref Range    WBC 9.4 4.8 - 10.8 K/uL    RBC 4.38 (L) 4.70 - 6.10 M/uL    Hemoglobin 13.4 (L) 14.0 - 18.0 g/dL    Hematocrit 40.8 (L) 42.0 - 52.0 %    MCV 93.2 81.4 - 97.8 fL    MCH 30.6 27.0 - 33.0 pg    MCHC 32.8 (L) 33.7 - 35.3 g/dL    RDW 51.0 (H) 35.9 - 50.0 fL    Platelet Count 129 (L) 164 - 446 K/uL    MPV 11.7 9.0 - 12.9 fL    Neutrophils-Polys 66.10 44.00 - 72.00 %    Lymphocytes 20.70 (L)  22.00 - 41.00 %    Monocytes 9.40 0.00 - 13.40 %    Eosinophils 2.40 0.00 - 6.90 %    Basophils 0.30 0.00 - 1.80 %    Immature Granulocytes 1.10 (H) 0.00 - 0.90 %    Nucleated RBC 0.00 /100 WBC    Neutrophils (Absolute) 6.24 1.82 - 7.42 K/uL    Lymphs (Absolute) 1.95 1.00 - 4.80 K/uL    Monos (Absolute) 0.89 (H) 0.00 - 0.85 K/uL    Eos (Absolute) 0.23 0.00 - 0.51 K/uL    Baso (Absolute) 0.03 0.00 - 0.12 K/uL    Immature Granulocytes (abs) 0.10 0.00 - 0.11 K/uL    NRBC (Absolute) 0.00 K/uL   COMP METABOLIC PANEL   Result Value Ref Range    Sodium 136 135 - 145 mmol/L    Potassium 4.2 3.6 - 5.5 mmol/L    Chloride 107 96 - 112 mmol/L    Co2 20 20 - 33 mmol/L    Anion Gap 9.0 0.0 - 11.9    Glucose 136 (H) 65 - 99 mg/dL    Bun 20 8 - 22 mg/dL    Creatinine 1.44 (H) 0.50 - 1.40 mg/dL    Calcium 10.8 (H) 8.5 - 10.5 mg/dL    AST(SGOT) 19 12 - 45 U/L    ALT(SGPT) 17 2 - 50 U/L    Alkaline Phosphatase 104 (H) 30 - 99 U/L    Total Bilirubin 1.5 0.1 - 1.5 mg/dL    Albumin 4.4 3.2 - 4.9 g/dL    Total Protein 6.6 6.0 - 8.2 g/dL    Globulin 2.2 1.9 - 3.5 g/dL    A-G Ratio 2.0 g/dL   TROPONIN   Result Value Ref Range    Troponin I 0.02 0.00 - 0.04 ng/mL   BTYPE NATRIURETIC PEPTIDE   Result Value Ref Range    B Natriuretic Peptide 507 (H) 0 - 100 pg/mL   LACTIC ACID   Result Value Ref Range    Lactic Acid 1.8 0.5 - 2.0 mmol/L   ESTIMATED GFR   Result Value Ref Range    GFR If  57 (A) >60 mL/min/1.73 m 2    GFR If Non  47 (A) >60 mL/min/1.73 m 2   TSH (Thyroid Stimulating Hormone)   Result Value Ref Range    TSH 11.330 (H) 0.380 - 5.330 uIU/mL   EKG (ER)   Result Value Ref Range    Report       [facility] Emergency Dept.    Test Date:  2018  Pt Name:    ALMA LANCASTER                Department: ER  MRN:        [id number]                      Room:       [facility]  Gender:     Male                         Technician: [id number]  :        [id number]                   Requested By:ER  TRIAGE PROTOCOL  Order #:    [id number]                    Reading MD:    Measurements  Intervals                                Axis  Rate:       73                           P:          0  WI:                                      QRS:        -90  QRSD:       200                          T:          56  QT:         440  QTc:        485    Interpretive Statements  VENTRICULAR-PACED COMPLEXES  NO FURTHER RHYTHM ANALYSIS ATTEMPTED DUE TO PACED RHYTHM  IVCD, CONSIDER ATYPICAL RBBB  No previous ECG available for comparison         RADIOLOGY  CT-HEAD W/O   Final Result      No noncontrast CT evidence of acute intracranial hemorrhage.      Moderate white matter disease with small left encephalomalacia along watershed distribution      Age-appropriate cerebral volume loss.      DX-CHEST-PORTABLE (1 VIEW)   Final Result      Cardiac silhouette enlargement with reticular pulmonary opacity which could indicate edema or underlying interstitial lung disease/scarring        Reevaluation   Time:3:32 AM  Vital signs:   Assessment: Sleeping, wakes easily, no apparent distress    COURSE & MEDICAL DECISION MAKING  Pertinent Labs & Imaging studies reviewed. (See chart for details)  Patient arrives for what appears to be syncope in the setting of multiple comorbidities. He has no findings today to suggest an emergent etiology and specifically does not appear to have any intracranial hemorrhage from his anticoagulation and no abnormalities in his cardiac enzymes. He does have a paced rhythm which is intermittently fluctuating between what appears to be atrial fibrillation atrial flutter and the paced rhythm itself. Patient will be admitted to the hospitalist service for further evaluation however given his recent evaluation at an outside facility, is likely there is not much more that can be done here. Patient did not experience any deterioration in his condition in the emergency department and was admitted to telemetry   FINAL  IMPRESSION  1. syncope  2.   3.         Electronically signed by: Sergio Hercules, 7/4/2018 12:43 AM he is a

## 2018-07-04 NOTE — ED NOTES
Linens changed. Patient stood at side of bed with 1 person assist. Patient given wash cloth to wipe eyes. Patient to ct.

## 2018-07-04 NOTE — THERAPY
PT order received; pt is currently awaiting plan of care to be established. Will hold therapy evaluation at this time and re-attempt with plan of care is established.

## 2018-07-05 ENCOUNTER — APPOINTMENT (OUTPATIENT)
Dept: RADIOLOGY | Facility: MEDICAL CENTER | Age: 78
End: 2018-07-05
Attending: INTERNAL MEDICINE
Payer: COMMERCIAL

## 2018-07-05 VITALS
SYSTOLIC BLOOD PRESSURE: 105 MMHG | DIASTOLIC BLOOD PRESSURE: 66 MMHG | TEMPERATURE: 96.8 F | WEIGHT: 284.17 LBS | BODY MASS INDEX: 34.6 KG/M2 | OXYGEN SATURATION: 94 % | HEART RATE: 70 BPM | HEIGHT: 76 IN | RESPIRATION RATE: 18 BRPM

## 2018-07-05 PROBLEM — I49.9 ARRHYTHMIA: Status: RESOLVED | Noted: 2018-07-04 | Resolved: 2018-07-05

## 2018-07-05 PROBLEM — R55 SYNCOPE: Status: RESOLVED | Noted: 2018-07-04 | Resolved: 2018-07-05

## 2018-07-05 PROCEDURE — 99217 PR OBSERVATION CARE DISCHARGE: CPT | Performed by: INTERNAL MEDICINE

## 2018-07-05 PROCEDURE — 96375 TX/PRO/DX INJ NEW DRUG ADDON: CPT | Mod: XU

## 2018-07-05 PROCEDURE — 96374 THER/PROPH/DIAG INJ IV PUSH: CPT | Mod: XU

## 2018-07-05 PROCEDURE — 99152 MOD SED SAME PHYS/QHP 5/>YRS: CPT

## 2018-07-05 PROCEDURE — C1894 INTRO/SHEATH, NON-LASER: HCPCS

## 2018-07-05 PROCEDURE — 700102 HCHG RX REV CODE 250 W/ 637 OVERRIDE(OP): Performed by: INTERNAL MEDICINE

## 2018-07-05 PROCEDURE — 99153 MOD SED SAME PHYS/QHP EA: CPT

## 2018-07-05 PROCEDURE — 361014 HCHG 6FR ARROW SWAN/THERMO

## 2018-07-05 PROCEDURE — 700111 HCHG RX REV CODE 636 W/ 250 OVERRIDE (IP)

## 2018-07-05 PROCEDURE — C1769 GUIDE WIRE: HCPCS

## 2018-07-05 PROCEDURE — A9270 NON-COVERED ITEM OR SERVICE: HCPCS | Performed by: INTERNAL MEDICINE

## 2018-07-05 PROCEDURE — G0378 HOSPITAL OBSERVATION PER HR: HCPCS

## 2018-07-05 PROCEDURE — 305478 HCHG 7.5FR EDWARDS SWAN/THERMO

## 2018-07-05 PROCEDURE — 93451 RIGHT HEART CATH: CPT

## 2018-07-05 RX ORDER — MIDAZOLAM HYDROCHLORIDE 1 MG/ML
INJECTION INTRAMUSCULAR; INTRAVENOUS
Status: COMPLETED
Start: 2018-07-05 | End: 2018-07-05

## 2018-07-05 RX ORDER — ADENOSINE 3 MG/ML
INJECTION, SOLUTION INTRAVENOUS
Status: COMPLETED
Start: 2018-07-05 | End: 2018-07-05

## 2018-07-05 RX ORDER — VERAPAMIL HYDROCHLORIDE 2.5 MG/ML
INJECTION, SOLUTION INTRAVENOUS
Status: COMPLETED
Start: 2018-07-05 | End: 2018-07-05

## 2018-07-05 RX ORDER — HEPARIN SODIUM,PORCINE 1000/ML
VIAL (ML) INJECTION
Status: COMPLETED
Start: 2018-07-05 | End: 2018-07-05

## 2018-07-05 RX ADMIN — HEPARIN SODIUM 2000 UNITS: 200 INJECTION, SOLUTION INTRAVENOUS at 10:03

## 2018-07-05 RX ADMIN — ADENOSINE 90 MG: 3 INJECTION, SOLUTION INTRAVENOUS at 11:06

## 2018-07-05 RX ADMIN — ACETAMINOPHEN 500 MG: 500 TABLET ORAL at 00:00

## 2018-07-05 RX ADMIN — FENTANYL CITRATE 75 MCG: 50 INJECTION, SOLUTION INTRAMUSCULAR; INTRAVENOUS at 11:07

## 2018-07-05 RX ADMIN — LIDOCAINE HYDROCHLORIDE 100 MG: 20 INJECTION, SOLUTION INTRAVENOUS at 10:03

## 2018-07-05 RX ADMIN — MIDAZOLAM HYDROCHLORIDE 2 MG: 1 INJECTION, SOLUTION INTRAMUSCULAR; INTRAVENOUS at 10:27

## 2018-07-05 RX ADMIN — HEPARIN SODIUM: 1000 INJECTION, SOLUTION INTRAVENOUS; SUBCUTANEOUS at 10:03

## 2018-07-05 ASSESSMENT — ENCOUNTER SYMPTOMS
COUGH: 0
PALPITATIONS: 0
INSOMNIA: 0
NERVOUS/ANXIOUS: 0
BACK PAIN: 0
DIZZINESS: 0
MYALGIAS: 0
SHORTNESS OF BREATH: 0
LOSS OF CONSCIOUSNESS: 0
WEIGHT LOSS: 0
DEPRESSION: 0

## 2018-07-05 ASSESSMENT — PAIN SCALES - GENERAL
PAINLEVEL_OUTOF10: 2
PAINLEVEL_OUTOF10: 5
PAINLEVEL_OUTOF10: 2
PAINLEVEL_OUTOF10: 0

## 2018-07-05 NOTE — PROGRESS NOTES
"Pt has been sitting in bed, calm, alert, stable. He has been refusing to ambulate this morning, says he \"will tomorrow\" RN provided education.   PT and OT came by but each time they came by there were doctors in the room at the bedside. Lots of discussion with family today with Dr. Richards. Family at bedside most of morning.   He denies pain, call light and items within reach, bed alarm on. Hourly rounding in place.   "

## 2018-07-05 NOTE — DISCHARGE INSTRUCTIONS
Discharge Instructions    Discharged to home by car with relative. Discharged via wheelchair, hospital escort: Yes.  Special equipment needed: Oxygen    Be sure to schedule a follow-up appointment with your primary care doctor or any specialists as instructed.     Discharge Plan:   Activity Level: Discussed  Confirmed Follow up Appointment: Patient to Call and Schedule Appointment  Confirmed Symptoms Management: Discussed  Medication Reconciliation Updated: Yes  Pneumococcal Vaccine Administered/Refused: Not given - Patient refused pneumococcal vaccine  Influenza Vaccine Indication: Patient Refuses, Indicated: Not available from distributor/    I understand that a diet low in cholesterol, fat, and sodium is recommended for good health. Unless I have been given specific instructions below for another diet, I accept this instruction as my diet prescription.   Other diet: heart healthy    Heart-Healthy Eating Plan  Introduction  Heart-healthy meal planning includes:  · Limiting unhealthy fats.  · Increasing healthy fats.  · Making other small dietary changes.  You may need to talk with your doctor or a diet specialist (dietitian) to create an eating plan that is right for you.  What types of fat should I choose?  · Choose healthy fats. These include olive oil and canola oil, flaxseeds, walnuts, almonds, and seeds.  · Eat more omega-3 fats. These include salmon, mackerel, sardines, tuna, flaxseed oil, and ground flaxseeds. Try to eat fish at least twice each week.  · Limit saturated fats.  ¨ Saturated fats are often found in animal products, such as meats, butter, and cream.  ¨ Plant sources of saturated fats include palm oil, palm kernel oil, and coconut oil.  · Avoid foods with partially hydrogenated oils in them. These include stick margarine, some tub margarines, cookies, crackers, and other baked goods. These contain trans fats.  What general guidelines do I need to follow?  · Check food labels  "carefully. Identify foods with trans fats or high amounts of saturated fat.  · Fill one half of your plate with vegetables and green salads. Eat 4-5 servings of vegetables per day. A serving of vegetables is:  ¨ 1 cup of raw leafy vegetables.  ¨ ½ cup of raw or cooked cut-up vegetables.  ¨ ½ cup of vegetable juice.  · Fill one fourth of your plate with whole grains. Look for the word \"whole\" as the first word in the ingredient list.  · Fill one fourth of your plate with lean protein foods.  · Eat 4-5 servings of fruit per day. A serving of fruit is:  ¨ One medium whole fruit.  ¨ ¼ cup of dried fruit.  ¨ ½ cup of fresh, frozen, or canned fruit.  ¨ ½ cup of 100% fruit juice.  · Eat more foods that contain soluble fiber. These include apples, broccoli, carrots, beans, peas, and barley. Try to get 20-30 g of fiber per day.  · Eat more home-cooked food. Eat less restaurant, buffet, and fast food.  · Limit or avoid alcohol.  · Limit foods high in starch and sugar.  · Avoid fried foods.  · Avoid frying your food. Try baking, boiling, grilling, or broiling it instead. You can also reduce fat by:  ¨ Removing the skin from poultry.  ¨ Removing all visible fats from meats.  ¨ Skimming the fat off of stews, soups, and gravies before serving them.  ¨ Steaming vegetables in water or broth.  · Lose weight if you are overweight.  · Eat 4-5 servings of nuts, legumes, and seeds per week:  ¨ One serving of dried beans or legumes equals ½ cup after being cooked.  ¨ One serving of nuts equals 1½ ounces.  ¨ One serving of seeds equals ½ ounce or one tablespoon.  · You may need to keep track of how much salt or sodium you eat. This is especially true if you have high blood pressure. Talk with your doctor or dietitian to get more information.  What foods can I eat?  Grains   Breads, including Norwegian, white, frieda, wheat, raisin, rye, oatmeal, and Italian. Tortillas that are neither fried nor made with lard or trans fat. Low-fat rolls, " including hotdog and hamburger buns and English muffins. Biscuits. Muffins. Waffles. Pancakes. Light popcorn. Whole-grain cereals. Flatbread. Carleen toast. Pretzels. Breadsticks. Rusks. Low-fat snacks. Low-fat crackers, including oyster, saltine, matzo, venus, animal, and rye. Rice and pasta, including brown rice and pastas that are made with whole wheat.  Vegetables   All vegetables.  Fruits   All fruits, but limit coconut.  Meats and Other Protein Sources   Lean, well-trimmed beef, veal, pork, and lamb. Chicken and turkey without skin. All fish and shellfish. Wild duck, rabbit, pheasant, and venison. Egg whites or low-cholesterol egg substitutes. Dried beans, peas, lentils, and tofu. Seeds and most nuts.  Dairy   Low-fat or nonfat cheeses, including ricotta, string, and mozzarella. Skim or 1% milk that is liquid, powdered, or evaporated. Buttermilk that is made with low-fat milk. Nonfat or low-fat yogurt.  Beverages   Mineral water. Diet carbonated beverages.  Sweets and Desserts   Sherbets and fruit ices. Honey, jam, marmalade, jelly, and syrups. Meringues and gelatins. Pure sugar candy, such as hard candy, jelly beans, gumdrops, mints, marshmallows, and small amounts of dark chocolate. Dima food cake.  Eat all sweets and desserts in moderation.  Fats and Oils   Nonhydrogenated (trans-free) margarines. Vegetable oils, including soybean, sesame, sunflower, olive, peanut, safflower, corn, canola, and cottonseed. Salad dressings or mayonnaise made with a vegetable oil. Limit added fats and oils that you use for cooking, baking, salads, and as spreads.  Other   Cocoa powder. Coffee and tea. All seasonings and condiments.  The items listed above may not be a complete list of recommended foods or beverages. Contact your dietitian for more options.   What foods are not recommended?  Grains   Breads that are made with saturated or trans fats, oils, or whole milk. Croissants. Butter rolls. Cheese breads. Sweet rolls.  Donuts. Buttered popcorn. Chow mein noodles. High-fat crackers, such as cheese or butter crackers.  Meats and Other Protein Sources   Fatty meats, such as hotdogs, short ribs, sausage, spareribs, way, rib eye roast or steak, and mutton. High-fat deli meats, such as salami and bologna. Caviar. Domestic duck and goose. Organ meats, such as kidney, liver, sweetbreads, and heart.  Dairy   Cream, sour cream, cream cheese, and creamed cottage cheese. Whole-milk cheeses, including blue (chao), Charlotte Marcin, Brie, Tacos, American, Havarti, Swiss, cheddar, Camembert, and [place]. Whole or 2% milk that is liquid, evaporated, or condensed. Whole buttermilk. Cream sauce or high-fat cheese sauce. Yogurt that is made from whole milk.  Beverages   Regular sodas and juice drinks with added sugar.  Sweets and Desserts   Frosting. Pudding. Cookies. Cakes other than aishwarya food cake. Candy that has milk chocolate or white chocolate, hydrogenated fat, butter, coconut, or unknown ingredients. Buttered syrups. Full-fat ice cream or ice cream drinks.  Fats and Oils   Gravy that has suet, meat fat, or shortening. Cocoa butter, hydrogenated oils, palm oil, coconut oil, palm kernel oil. These can often be found in baked products, candy, fried foods, nondairy creamers, and whipped toppings. Solid fats and shortenings, including way fat, salt pork, lard, and butter. Nondairy cream substitutes, such as coffee creamers and sour cream substitutes. Salad dressings that are made of unknown oils, cheese, or sour cream.  The items listed above may not be a complete list of foods and beverages to avoid. Contact your dietitian for more information.   This information is not intended to replace advice given to you by your health care provider. Make sure you discuss any questions you have with your health care provider.  Document Released: 06/18/2013 Document Revised: 05/25/2017 Document Reviewed: 06/11/2015  © 2017 Elsevier    Special Instructions:  None    · Is patient discharged on Warfarin / Coumadin?   No     Depression / Suicide Risk    As you are discharged from this [facility] facility, it is important to learn how to keep safe from harming yourself.    Recognize the warning signs:  · Abrupt changes in personality, positive or negative- including increase in energy   · Giving away possessions  · Change in eating patterns- significant weight changes-  positive or negative  · Change in sleeping patterns- unable to sleep or sleeping all the time   · Unwillingness or inability to communicate  · Depression  · Unusual sadness, discouragement and loneliness  · Talk of wanting to die  · Neglect of personal appearance   · Rebelliousness- reckless behavior  · Withdrawal from people/activities they love  · Confusion- inability to concentrate     If you or a loved one observes any of these behaviors or has concerns about self-harm, here's what you can do:  · Talk about it- your feelings and reasons for harming yourself  · Remove any means that you might use to hurt yourself (examples: pills, rope, extension cords, firearm)  · Get professional help from the community (Mental Health, Substance Abuse, psychological counseling)  · Do not be alone:Call your Safe Contact- someone whom you trust who will be there for you.  · Call your local CRISIS HOTLINE 814-7964 or 508-126-6226  · Call your local Children's Mobile Crisis Response Team [place] (685) 534-7483 or www.T-Networks  · Call the toll free National Suicide Prevention Hotlines   · National Suicide Prevention Lifeline 038-196-UBWU (0995)  · National Hope Line Network 800-SUICIDE (834-5059)      Pulmonary Hypertension  Pulmonary hypertension is high blood pressure within the arteries in your lungs (pulmonary arteries). It is different than having high blood pressure elsewhere in your body, such as blood pressure that is measured with a blood pressure cuff. Pulmonary hypertension makes it harder for  blood to flow through the lungs. As a result, the heart must work harder to pump blood through the lungs, and it may be harder for you to breathe. Over time, this can weaken the heart muscle. Pulmonary hypertension is a serious condition and it can be fatal.  What are the causes?  Many different medical conditions can cause pulmonary hypertension. Pulmonary hypertension can be categorized by cause into five groups:  Group 1   Pulmonary hypertension that is caused by abnormal growth of small blood vessels in the lungs (pulmonary arterial hypertension). The abnormal blood vessel growth may have no known cause, or it may be:  · Passed along from a parent (hereditary).  · Caused by another disease, such as a connective tissue disease (including lupus or scleroderma) or HIV.  · Caused by certain drugs or toxins.  Group 2   Pulmonary hypertension that is caused by weakness of the main chamber of the heart (left ventricle) or heart valve disease.  Group 3   Pulmonary hypertension that is caused by lung disease or low oxygen levels. Causes in this group include:  · Emphysema or chronic obstructive pulmonary disease (COPD).  · Untreated sleep apnea.  · Pulmonary fibrosis.  Group 4   Pulmonary hypertension that is caused by blood clots in the lungs (pulmonary emboli).  Group 5   Other causes of pulmonary hypertension, such as sickle cell anemia, or a mix of multiple causes.  What are the signs or symptoms?  Symptoms of this condition include:  · Shortness of breath. You may notice shortness of breath with:  ¨ Activity, such as walking.  ¨ No activity.  · Tiredness and fatigue.  · Dizziness or fainting.  · Rapid heartbeat or feeling your heart flutter or skip a beat (palpitations).  · Neck vein enlargement.  · Bluish color to your lips and fingertips.  How is this diagnosed?  This condition may be diagnosed by:  · Chest X-ray.  · Arterial blood gases. This test checks the acidity of your blood as well as your blood oxygen and  carbon dioxide levels.  · CT scan. This test can provide detailed images of your lungs.  · Pulmonary function test. This test measures how much air your lungs can hold. It also tests how well air moves in and out of your lungs.  · Electrocardiogram (ECG). This test traces the electrical activity of your heart.  · Echocardiogram. This test is used to look at your heart in motion and check how it is functioning.  · Heart catheterization. This test can measure the pressure in your pulmonary artery and the right side of your heart.  · Lung biopsy. This procedure involves checking a sample of lung tissue to find underlying causes.  How is this treated?  There is no cure for pulmonary hypertension, but treatment can help to relieve symptoms and slow the progress of the condition. Treatment can involve:  · Medicines, such as:  ¨ Blood pressure medicines.  ¨ Medicines to relax (dilate) the pulmonary blood vessels.  ¨ Water pills to get rid of extra fluid (diuretic medicines).  ¨ Blood-thinning medicines.  · Surgery. For severe pulmonary hypertension that does not respond to medical treatment, heart-lung or lung transplant may be needed.  Follow these instructions at home:  · Take medicines only as directed by your health care provider. These include over-the-counter medicines and prescription medicines. Take all medicines exactly as instructed. Do not change or stop medicines without first checking with your health care provider.  · Do not smoke. If you need help quitting, ask your health care provider.  · Eat a healthy diet.  · Limit your salt (sodium) intake to less than 2,300 mg per day.  · Stay as active as possible. Exercise as directed by your health care provider. Talk with your health care provider about what type of exercise is safe for you.  · Avoid high altitudes.  · Avoid hot tubs and saunas.  · Avoid becoming pregnant, if this applies. Talk with your health care provider about safe methods of birth  control.  · Keep all follow-up visits as directed by your health care provider. This is important.  Get help right away if:  · You have severe shortness of breath.  · You develop chest pain or pressure in your chest.  · You cough up blood.  · You develop swelling of your feet or legs.  · You have a significant increase in weight within 1-2 days.  This information is not intended to replace advice given to you by your health care provider. Make sure you discuss any questions you have with your health care provider.  Document Released: 10/14/2008 Document Revised: 07/07/2017 Document Reviewed: 06/09/2014  [employer] Interactive Patient Education © 2017 [employer]

## 2018-07-05 NOTE — CARE PLAN
Problem: Venous Thromboembolism (VTW)/Deep Vein Thrombosis (DVT) Prevention:  Goal: Patient will participate in Venous Thrombosis (VTE)/Deep Vein Thrombosis (DVT)Prevention Measures    Intervention: Encourage patient to perform ankle flex, foot rotation, and knee flex exercises in addition to other prophylatic measures every hour while awake  Taught pt to do range of motion exercises, and placed SCDs, as xarelto will be held before procedure.       Problem: Mobility  Goal: Risk for activity intolerance will decrease  Outcome: PROGRESSING AS EXPECTED  Encouraged ambulation and provided education on importance. Worked with interdisciplinary team to ensure safe mobility.

## 2018-07-05 NOTE — PROGRESS NOTES
Pt arrived back to tele floor, alert, awake, denies pain. Tele monitor back on, he is still paced in the 70's, no change on tele. VSS  Neck is soft, no signs of hematoma, and no oozing.

## 2018-07-05 NOTE — DISCHARGE PLANNING
Transitional Care Navigator:    Per chart review, pt has had multiple falls in the past month. Son reports that pt has had significant functional decline the past 3 months.   Pt is requiring assistance of one per nursing.   Pt may be an appropriate candidate for SNF placement prior to d/c home or at the least home health.  Barrier to placement is pt is under OBSERVATION status.   PT/OT is pending after heart catheterization.

## 2018-07-05 NOTE — PROGRESS NOTES
Bedside report completed with noc RN. Pt resting, denies pain, sitting up in bed, alert and awake. Bed locked in low position, call light within reach. Bed alarm on. Reviewed plan of care with noc RN and pt. He has been NPO since midnight.

## 2018-07-05 NOTE — PROGRESS NOTES
Pt has still been refusing to ambulate the halls, but agreed to be at edge of bed for dinner. He has been allowing us to help him with oral care.   He had mild arm pain, medicated with tylenol per md order. Denies any pain or SOB nor any other complaints. Call light and items within reach, RN provided snacks and fed to him as he has bilateral casts on arms.   Family has gone home for the day, RN updated them on POC, provided education on infection prevention and hand hygeine. Provided education to not get up out of bed without calling with call light first.   Hourly rounding in place.

## 2018-07-05 NOTE — DISCHARGE SUMMARY
Discharge Summary    CHIEF COMPLAINT ON ADMISSION  Chief Complaint   Patient presents with   • Syncope     Patient was found down by family       Reason for Admission  EMS     Admission Date  7/4/2018    CODE STATUS  Full Code    HPI & HOSPITAL COURSE  This is a 78 y.o. male here with above medical issues. Patient has a complicated past medical history and has been suffering from recurrent syncope, which seems to be exacerbated at higher altitudes and associated with micturition. He had an extensive work up for this issue at [facility] [place] a few weeks ago with an essentially negative work up. Patient has known chronic respiratory failure with presumed both hypoxia/hypercapnia. After talking with his home cardiologist, Dr Swain, we elected to proceed forward with a right heart cath which showed:    1.  Severe pulmonary arterial hypertension.  2.  Positive pulmonary vascular vasoreactivity response to adenosine infusion with a 16% reduction and mean pulmonary artery pressure.    Patient likely has Class III pulmonary htn 2/2 COPD and prior tobacco history. Additionally, patient likely has some urge incontinence and needs to seek out a urologist in [place] for possible urodynamic mapping.    Patient apparently has tried viagra in the past per wife, but unclear if this was done for PULM HTN. He might benefit from flolan and this will be followed up with his medical team in [place]. He had no recurrent syncope while here and no arrhythmias noted while on telemetry. Patient was also informed that he should probably avoid [place] and other high altitude places in the future given his chronic lung conditions and worsening hypoxia causing recurrent syncope.           Therefore, he is discharged in fair and stable condition to home with close outpatient follow-up.        Discharge Date  7/5/2018    FOLLOW UP ITEMS POST DISCHARGE  Follow up with Dr Swain in 1 week for possible Flolan therapy for pulm  HTN    DISCHARGE DIAGNOSES  Principal Problem (Resolved):    Syncope POA: Yes  Active Problems:    HLD (hyperlipidemia) POA: Unknown  Resolved Problems:    Arrhythmia POA: Yes      FOLLOW UP  No future appointments.  Primary Care Provider    Schedule an appointment as soon as possible for a visit in 1 week  Hospital follow-up appointment with PCP      MEDICATIONS ON DISCHARGE     Medication List      CONTINUE taking these medications      Instructions   albuterol 108 (90 Base) MCG/ACT Aers inhalation aerosol   Inhale 2 Puffs by mouth every four hours as needed for Shortness of Breath.  Dose:  2 Puff     allopurinol 300 MG Tabs  Commonly known as:  ZYLOPRIM   Take 300 mg by mouth every morning.  Dose:  300 mg     ANDROGEL 40.5 MG/2.5GM (1.62%) Gel  Generic drug:  Testosterone   Apply 40.5 mg to skin as directed every day.  Dose:  40.5 mg     atorvastatin 10 MG Tabs  Commonly known as:  LIPITOR   Take 10 mg by mouth every morning.  Dose:  10 mg     digoxin 125 MCG Tabs  Commonly known as:  LANOXIN   Take 125 mcg by mouth every morning.  Dose:  125 mcg     Fish Oil 1200 MG Caps   Take 1,200 mg by mouth every morning.  Dose:  1200 mg     GLUCOSAMINE CHONDR 1500 COMPLX PO   Take 1 Tab by mouth every morning.  Dose:  1 Tab     INCRUSE ELLIPTA 62.5 MCG/INH Aepb  Generic drug:  Umeclidinium Bromide   Inhale 1 Puff by mouth every morning.  Dose:  1 Puff     multivitamin Tabs   Take 1 Tab by mouth every day.  Dose:  1 Tab     rivaroxaban 20 MG Tabs tablet  Commonly known as:  XARELTO   Take 20 mg by mouth every morning.  Dose:  20 mg     tramadol 50 MG Tabs  Commonly known as:  ULTRAM   Take 50 mg by mouth 2 times a day as needed.  Dose:  50 mg     Vitamin D3 1000 units Caps   Take 1,000 Units by mouth every morning.  Dose:  1000 Units        STOP taking these medications    ibuprofen 200 MG Tabs  Commonly known as:  MOTRIN            Allergies  No Known Allergies    DIET  Orders Placed This Encounter   Procedures   • Diet  Order Regular     Standing Status:   Standing     Number of Occurrences:   1     Order Specific Question:   Diet:     Answer:   Regular [1]   • Diet NPO after Midnight     Standing Status:   Standing     Number of Occurrences:   8     Order Specific Question:   Restrict to:     Answer:   Sips with Medications [3]       ACTIVITY  As tolerated.  Weight bearing as tolerated    CONSULTATIONS  -cards    PROCEDURES  RHC as outlined above.    CT-HEAD W/O   Final Result      No noncontrast CT evidence of acute intracranial hemorrhage.      Moderate white matter disease with small left encephalomalacia along watershed distribution      Age-appropriate cerebral volume loss.      DX-CHEST-PORTABLE (1 VIEW)   Final Result      Cardiac silhouette enlargement with reticular pulmonary opacity which could indicate edema or underlying interstitial lung disease/scarring            LABORATORY  Lab Results   Component Value Date    SODIUM 136 07/04/2018    POTASSIUM 4.2 07/04/2018    CHLORIDE 107 07/04/2018    CO2 20 07/04/2018    GLUCOSE 136 (H) 07/04/2018    BUN 20 07/04/2018    CREATININE 1.44 (H) 07/04/2018        Lab Results   Component Value Date    WBC 9.4 07/04/2018    HEMOGLOBIN 13.4 (L) 07/04/2018    HEMATOCRIT 40.8 (L) 07/04/2018    PLATELETCT 129 (L) 07/04/2018        Total time of the discharge process exceeds 35 minutes.

## 2018-07-05 NOTE — PROGRESS NOTES
Cardiology Progress Note               Author: Anh RAND Dudley Date & Time created: 2018  8:14 AM     Interval History:    Paced on tele  Slept badly, awaiting RHC today    Chief Complaint:    Recurrent syncope in the setting of LO/CPAP & home O2 for COPD    Review of Systems   Constitutional: Positive for malaise/fatigue. Negative for weight loss.   Respiratory: Negative for cough and shortness of breath.    Cardiovascular: Negative for chest pain and palpitations.   Musculoskeletal: Negative for back pain and myalgias.   Neurological: Negative for dizziness and loss of consciousness. Sensory change:      Psychiatric/Behavioral: Negative for depression. The patient is not nervous/anxious and does not have insomnia.    All other systems reviewed and are negative.      Physical Exam   Constitutional: He appears well-nourished.   overwt, plethoric, talkative   HENT:   Head: Normocephalic and atraumatic.   Eyes: EOM are normal. Pupils are equal, round, and reactive to light. No scleral icterus.   Neck: Neck supple. No JVD present. No thyromegaly present.   Cardiovascular: Normal rate, regular rhythm and intact distal pulses.    No murmur heard.  Pulmonary/Chest: Breath sounds normal. No respiratory distress. He exhibits no tenderness.   Pm in chest, well seated   Abdominal: Bowel sounds are normal.   Musculoskeletal: Normal range of motion. He exhibits edema (1+ bilat). He exhibits no tenderness.   Lymphadenopathy:     He has no cervical adenopathy.   Neurological: He exhibits normal muscle tone. Coordination normal.   Skin: Skin is warm and dry. No rash noted.   Psychiatric: He has a normal mood and affect. His behavior is normal.   Vitals reviewed.      Hemodynamics:  Temp (24hrs), Av.2 °C (97.2 °F), Min:36.1 °C (97 °F), Max:36.3 °C (97.3 °F)  Temperature: 36.2 °C (97.1 °F)  Pulse  Av.9  Min: 67  Max: 77   Blood Pressure : 137/78     Respiratory:    Respiration: 19, Pulse Oximetry: 95 %, O2 Daily  Delivery Respiratory :  (cpap bleed in)     Work Of Breathing / Effort: Mild  RUL Breath Sounds: Diminished, RML Breath Sounds: Diminished, RLL Breath Sounds: Diminished, GENOVEVA Breath Sounds: Diminished, LLL Breath Sounds: Diminished  Fluids:       GI/Nutrition:  Orders Placed This Encounter   Procedures   • Diet Order Regular     Standing Status:   Standing     Number of Occurrences:   1     Order Specific Question:   Diet:     Answer:   Regular [1]   • Diet NPO after Midnight     Standing Status:   Standing     Number of Occurrences:   8     Order Specific Question:   Restrict to:     Answer:   Sips with Medications [3]     Lab Results:  Recent Labs      07/04/18   0035   WBC  9.4   RBC  4.38*   HEMOGLOBIN  13.4*   HEMATOCRIT  40.8*   MCV  93.2   MCH  30.6   MCHC  32.8*   RDW  51.0*   PLATELETCT  129*   MPV  11.7     Recent Labs      07/04/18   0035   SODIUM  136   POTASSIUM  4.2   CHLORIDE  107   CO2  20   GLUCOSE  136*   BUN  20   CREATININE  1.44*   CALCIUM  10.8*     Recent Labs      07/04/18   1346   APTT  38.6*   INR  1.72*     Recent Labs      07/04/18   0035   BNPBTYPENAT  507*     Recent Labs      07/04/18   0035  07/04/18   0424   TROPONINI  0.02  0.02   BNPBTYPENAT  507*   --              Medical Decision Making, by Problem:  Active Hospital Problems    Diagnosis   • *Syncope [R55]   • Arrhythmia [I49.9]   • HLD (hyperlipidemia) [E78.5]       Plan:    78-year-old gentleman who lives at [facility] and follows with cardiology at [facility].  He has had recurrent syncope, this time at midnight, before 4 July.  He was playing cards with his friend, he was not wearing his home oxygen like he is supposed to.    His workup is involved MRI of the brain carotids echo all of which were normal without indication of underlying heart disease.    We are consulted after discussion with his primary cardiologist to do a right heart catheterization to estimate pressures in his lung beds.  There is concern  about pulmonary hypertension which may be exacerbating or causing this.    Recent echo at [facility] was normal and reassuring according to his primary cardiologist.  His BNP here was elevated but there was no evidence at all or suspicion of acute coronary syndrome or myocardial infarction.    Plan for right heart catheterization today.  The patient is n.p.o.  Unfortunately the patient is frustrated that this could not have been done yesterday, I again gave him supportive care telling him that we would get to it as soon as we can.  He is elected to do this as an inpatient, I discussed this again with him, AND HE VOICES UNDERSTANDING      Quality-Core Measures

## 2018-07-05 NOTE — PROGRESS NOTES
Pt discharged via wheelchair with wife to car to home. Pt was AOx4 at time of discharge. PIVs removed, tele box taken off. RN went over discharge instructions thoroughly including medications, healthy diet, activity, signs and symptoms of infection, heart attack and stroke. Went over importance of wearing Oxygen, and avoiding high-altitude locations, RN recommended NOT going back to [facility], and driving straight to their [place] home and wearing O2. RN reviewed when to call MD and when to call 911, wife and patient verbalized understanding. Paperwork signed and all questions answered. Follow up appointment is set with PCP per wife, RN discussed vital importance of following through with appointment. All personal belongings with patient. A copy of AVS discharge instructions given to patient.

## 2018-07-05 NOTE — PROGRESS NOTES
RN and CNA assisted pt into chair, he was able to stand on his own from bed, but needed help going from chair to bed, and became very short of breath when going from chair back to bed. Took about 1 minute to recover from the shortness of breath. Pt transported to cath lab stable and SOB had resolved. Monitor room notified by RN.   Dr. Richards was notified of this SOB with exertion during bedside rounds.

## 2018-07-05 NOTE — PROGRESS NOTES
Bedside report completed. Pt lying in bed comfortably.  A/O x 4, pain 2/10 in bilat arms. Safety precautions in place. Call light and personal belongings within reach. Pt educated on POC and all questions answered at this time.  Educated patient on calling for assistance when needed. All pt needs are met at this time.  Will continue to monitor.

## 2018-07-05 NOTE — CATH LAB
Immediate Post-Operative Note      PreOp Diagnosis:   1. Recurrent syncope  2.  Suspected t pulmonary hypertension  3.  COPD on oxygen therapy.  4.  Obstructive sleep apnea on CPAP therapy.    PostOp Diagnosis:   1.  Severe pulmonary arterial hypertension.  2.  Positive pulmonary vascular vasoreactivity response to adenosine infusion with a 16% reduction and mean pulmonary artery pressure.    Procedure(s) :  Right heart catheterization.  Pulmonary vascular vasoreactivity assessment with IV adenosine infusion.    Surgeon(s):  Kashif Valdez M.D.    Type of Anesthesia: Moderate Sedation    Specimen: None    Complications: none    Plan:  1.  Candidate for advanced pulmonary arterial hypertension therapy.      Kashif Valdez M.D.  7/5/2018 11:09 AM

## 2018-07-06 NOTE — PROCEDURES
DATE OF SERVICE:  07/05/2018    PROCEDURE:  Cardiac catheterization.  A.  Right heart catheterization.  B.  Pulmonary vascular vasoreactivity assessment with intravenous adenosine.  C.  Right jugular vein approach.    PREPROCEDURE DIAGNOSES:  1.  Suspected pulmonary hypertension.  2.  Chronic obstructive pulmonary disease, on oxygen therapy.  3.  Obstructive sleep apnea with CPAP therapy.  4.  Permanent pacemaker.  5.  Recurrent syncope.    POSTPROCEDURE DIAGNOSES:  1.  Severe pulmonary arterial hypertension.  2.  Positive pulmonary vascular vasodilator response to adenosine infusion   with a 17% reduction in mean pulmonary artery pressure.  3.  Right heart failure with elevated mean right atrial pressure.    CONSCIOUS SEDATION: I supervised the administration and monitoring of   conscious sedation from 10:23 to 11:07.    PHYSICIAN:  Kashif Valdez MD    REFERRING PHYSICIAN:  Anh Buckner MD    COMPLICATIONS:  None.    MEDICATIONS:  1.  Versed 2 mg IV.  2.  Fentanyl 75 mcg IV.  3.  Lidocaine 2% subcutaneous.  4.  Adenosine infusion protocol.    DESCRIPTION OF PROCEDURE:  After an informed consent was obtained, the patient   was brought to the cardiac catheterization laboratory where he was prepped,   draped, and anesthetized in usual manner.    Using ultrasound guidance and modified Seldinger technique, a 6-Puerto [place]ican x 10 cm   introducer sheath was inserted in the right internal jugular vein.    Next, a 6-[place] balloon tip Kirksey-Ranjan catheter was advanced into the   pulmonary artery, but however, it could not be further advanced into the pulmonary   capillary wedge position.  Right heart pressures were obtained.    The 6-[place] Kirksey-Ranjan catheter was removed.  The 6-[place]ican introducer sheath   was exchanged for an 8-[place]ican introducer sheath.  Next, a 7.5-[place] balloon   tip Kirksey-Ranjan catheter was inserted and with the use of a 0.025 J-tipped wire,   the catheter was advanced into the wedge position.   Calibrated pulmonary   capillary wedge pressures were obtained.  Thermodilution cardiac outputs were   obtained.    Findings revealed severe pulmonary hypertension with a normal pulmonary   capillary wedge pressure therefore it was elected to proceed with a pulmonary   vascular vasoreactivity assessment with adenosine infusion.    Next, adenosine infusion protocol was initiated with titrated doses up from 50   mcg/kg per minute to 100 mcg/kg per minute and 150 mcg/kg per minute with serial   pulmonary pressures.  The adenosine infusion was discontinued.    At the end of the procedure, the catheter was removed and hemostasis was   achieved with manual compression at the right internal jugular vein site.  The patient tolerated the procedure well.    FINDINGS:  HEMODYNAMICS:  RIGHT HEART PRESSURES:  1.  Right atrial pressure mean 18 mmHg.  2.  Right ventricular pressure is 77/17.  3.  Pulmonary artery pressure 75/34, mean of 50 mmHg.  4.  Pulmonary capillary wedge pressure of 14 mmHg.    Cardiac output thermodilution method 4.1 liters per minute, cardiac index 1.6   liters/min per meter squared.    Pulmonary vascular resistance 6.4 Wood units.    Pulmonary vascular vasoreactivity assessed with intravenous adenosine:  1.  Baseline:  Pulmonary artery pressure 77/32, mean of 48.  2.  Adenosine 50 mcg/kg per minute.  Pulmonary artery pressure 69/34, mean of 45.  3.  Adenosine 100 mcg/kg per minute.  Pulmonary artery pressure 67/30, mean of 45.  4.  Adenosine 150 mcg/kg per minute.  Pulmonary artery pressure 56/26, mean of 40.    Results: Positive pulmonary vascular vasodilator response with a 17% decrease   in mean pulmonary artery pressure with intravenous adenosine    RECOMMENDATIONS:  The patient appears to be a candidate for advanced pulmonary   arterial hypertension therapy.       ____________________________________     MD SOPHY MACKENZIE / NTS    DD:  07/05/2018 17:18:14  DT:  07/05/2018 17:40:29    D#:   [id number]  Job#:  [id number]

## 2018-07-10 ENCOUNTER — TELEPHONE (OUTPATIENT)
Dept: CARDIOLOGY | Facility: MEDICAL CENTER | Age: 78
End: 2018-07-10

## 2018-07-10 NOTE — TELEPHONE ENCOUNTER
----- Message from Nuha Betancourt sent at 7/10/2018  1:06 PM PDT -----  Regarding: doctor to doctor in LA's absence  Contact: [phone]  LA/tamy Swain from [facility] calling about mutual pt.  Dr Swain understands LA won't be back until tomorrow, but wishes to talk with you in her absence today.  Please call Dr Swain at [phone]    ==============================================================    Attempted to call Dr Swain back, no answer, left vm to call back

## 2018-07-10 NOTE — TELEPHONE ENCOUNTER
Dr Swain called back, he wanted to know if he can obtain a copy of the Cath report that have pt's pressure readings, he requested it to be faxed to [phone]    Cath Procedure report printed and faxed to Dr Swain